# Patient Record
Sex: MALE | Race: WHITE | NOT HISPANIC OR LATINO | Employment: FULL TIME | URBAN - METROPOLITAN AREA
[De-identification: names, ages, dates, MRNs, and addresses within clinical notes are randomized per-mention and may not be internally consistent; named-entity substitution may affect disease eponyms.]

---

## 2022-11-20 ENCOUNTER — APPOINTMENT (OUTPATIENT)
Dept: CT IMAGING | Facility: CLINIC | Age: 37
End: 2022-11-20
Attending: EMERGENCY MEDICINE
Payer: COMMERCIAL

## 2022-11-20 ENCOUNTER — HOSPITAL ENCOUNTER (EMERGENCY)
Facility: CLINIC | Age: 37
Discharge: HOME | End: 2022-11-20
Attending: EMERGENCY MEDICINE | Admitting: EMERGENCY MEDICINE
Payer: COMMERCIAL

## 2022-11-20 ENCOUNTER — APPOINTMENT (OUTPATIENT)
Dept: GENERAL RADIOLOGY | Facility: CLINIC | Age: 37
End: 2022-11-20
Attending: EMERGENCY MEDICINE
Payer: COMMERCIAL

## 2022-11-20 VITALS
DIASTOLIC BLOOD PRESSURE: 75 MMHG | OXYGEN SATURATION: 99 % | TEMPERATURE: 98.2 F | SYSTOLIC BLOOD PRESSURE: 128 MMHG | RESPIRATION RATE: 18 BRPM | HEART RATE: 83 BPM

## 2022-11-20 DIAGNOSIS — S92.425A NONDISPLACED FRACTURE OF DISTAL PHALANX OF LEFT GREAT TOE, INITIAL ENCOUNTER FOR CLOSED FRACTURE: ICD-10-CM

## 2022-11-20 DIAGNOSIS — S00.83XA TRAUMATIC HEMATOMA OF FOREHEAD, INITIAL ENCOUNTER: ICD-10-CM

## 2022-11-20 DIAGNOSIS — S52.572A OTHER CLOSED INTRA-ARTICULAR FRACTURE OF DISTAL END OF LEFT RADIUS, INITIAL ENCOUNTER: ICD-10-CM

## 2022-11-20 PROBLEM — J31.0 NONALLERGIC RHINITIS: Status: ACTIVE | Noted: 2022-09-22

## 2022-11-20 PROBLEM — K40.90 LEFT INGUINAL HERNIA: Status: ACTIVE | Noted: 2020-09-17

## 2022-11-20 PROCEDURE — 73660 X-RAY EXAM OF TOE(S): CPT | Mod: LT

## 2022-11-20 PROCEDURE — 73110 X-RAY EXAM OF WRIST: CPT | Mod: LT

## 2022-11-20 PROCEDURE — 28490 TREAT BIG TOE FRACTURE: CPT | Mod: LT

## 2022-11-20 PROCEDURE — 70450 CT HEAD/BRAIN W/O DYE: CPT

## 2022-11-20 PROCEDURE — 25600 CLTX DST RDL FX/EPHYS SEP WO: CPT | Mod: LT

## 2022-11-20 PROCEDURE — 99285 EMERGENCY DEPT VISIT HI MDM: CPT | Mod: 25

## 2022-11-20 RX ORDER — IBUPROFEN 600 MG/1
600 TABLET, FILM COATED ORAL EVERY 6 HOURS PRN
Qty: 30 TABLET | Refills: 0 | Status: SHIPPED | OUTPATIENT
Start: 2022-11-20 | End: 2022-12-20

## 2022-11-20 RX ORDER — CYCLOBENZAPRINE HCL 10 MG
5-10 TABLET ORAL 3 TIMES DAILY PRN
Qty: 20 TABLET | Refills: 0 | Status: SHIPPED | OUTPATIENT
Start: 2022-11-20

## 2022-11-20 ASSESSMENT — ENCOUNTER SYMPTOMS
LIGHT-HEADEDNESS: 1
ARTHRALGIAS: 1
VOMITING: 0
NUMBNESS: 0
NAUSEA: 0
HEADACHES: 1
WOUND: 1
CONFUSION: 0
NECK PAIN: 0
NECK STIFFNESS: 1

## 2022-11-20 ASSESSMENT — ACTIVITIES OF DAILY LIVING (ADL): ADLS_ACUITY_SCORE: 35

## 2022-11-20 NOTE — ED TRIAGE NOTES
Fall from ladder. Feet were about 8ft off the ground. Contusion/abrasion to forehead. Left wrist pain. Left big toe pain. Denies LOC. Lightheaded for a time. Happened around 1200. Denies c-spine tenderness. Denies head pain. Feels alert. Denies blood thinners. Pupils equal and reactive to light.

## 2022-11-21 NOTE — ED PROVIDER NOTES
History   Chief Complaint:  Fall    The history is provided by the patient.      Con Sorensen is a 37 year old left handed male with history of inguinal hernia and lumbago who presents for evaluation after a fall. The patient reports that he was on a ladder one story up (8-10 ft.) hanging minda lights at his house when the ladder slipped out from under him around 1130 this morning. States that he landed on his left wrist and face. Notes that he has left wrist, left great toe, and left knee pain. Adds that he was lightheaded after the fall and that his neck is stiff. No LOC. Reports that he had a headache which worsened upon arrival to the emergency department, but improved after taking his own ibuprofen. Denies syncope, neck pain, abdominal pain, chest pain, shortness of breath, pain with respiration, confusion, numbness, nausea, and vomiting. Denies pain in left hand, fingers, or elbow. No injuries to the right upper extremity and right lower extremity. He denies any other symptoms.    Review of Systems   Gastrointestinal: Negative for nausea and vomiting.   Musculoskeletal: Positive for arthralgias and neck stiffness. Negative for neck pain.   Skin: Positive for wound (forehead).   Neurological: Positive for light-headedness and headaches. Negative for syncope and numbness.   Psychiatric/Behavioral: Negative for confusion.   All other systems reviewed and are negative.    Allergies:  No Known Allergies    Medications:  Fluticasone  Loratadine    Past Medical History:     Inguinal hernia, left  Lumbago    Past Surgical History:    Liver surgery    Family History:    Mother- thyroid disorder  Sister- thyroid disorder    Social History:  Presents alone  Presents via private vehicle     Physical Exam     Patient Vitals for the past 24 hrs:   BP Temp Temp src Pulse Resp SpO2   11/20/22 2106 128/75 -- -- 83 18 99 %   11/20/22 1246 133/78 98.2  F (36.8  C) Temporal 92 20 99 %     Physical Exam  General: Well  appearing, nontoxic. Resting comfortably  Head:  Right frontal forehead contusion/hematoma 6cm in diameter with superficial abrasion.  No definite bony crepitus or step-offs.  Area is tender to palpation.  Remainder of the scalp and head is otherwise atraumatic and nontender.  Eyes:  Pupils are equal, round reactive to light EOMI, no nystagmus    Conjunctivae non-injected and sclerae white  ENT:    The external nose is normal    Facial bones are normal and atraumatic.  No epistaxis.    Pinnae are normal  Neck:  Normal range of motion. Cervical spine nontender, no stepoffs    There is no rigidity noted    Trachea is in the midline  CV:  Regular rate and rhythm     Normal S1/S2, no S3/S4    No murmur or rub. Radial pulses 2+ bilaterally.  Resp:  Lungs are clear and equal bilaterally  There is no tachypnea    No increased work of breathing    No rales, wheezing, or rhonchi  GI:  Abdomen is soft, no rigidity or guarding    No distension, or mass    No tenderness or rebound tenderness   MS:  Normal muscular tone. T and L spine non-tender without stepoffs.  Significant tenderness palpation and swelling over the distal left dorsal radius.  Significant pain with any range of motion of the wrist or pronation supination of the forearm.  The remainder of the bilateral upper extremities otherwise atraumatic with full painless range of motion.  CMS is intact distally in the bilateral upper extremities.  Bilateral lower extremities are atraumatic with the exception of tenderness bruising and swelling overlying the distal left great toe.  CMS intact in the bilateral distal lower extremities.  Hips and pelvis are stable and nontender to compression.    Symmetric motor strength    No lower extremity edema  Skin:  No rash or acute skin lesions noted  Neuro: A&Ox3, GCS 15    CN II - XII intact    Speech clear, fluent, and normal    Strength 5/5 and symmetric in bilateral upper and lower extremities.    No pronator drift. No leg drift.  SILT throughout.    No ankle clonus    FTN testing normal. No tremor.     Gait normal    No meningismus   Psych:  Normal affect. Appropriate interactions.    Emergency Department Course   Imaging:  CT Head w/o Contrast   Final Result   IMPRESSION:   1.  Normal head CT.      XR Wrist Left G/E 3 Views   Final Result   IMPRESSION:   1.  Nondisplaced intra-articular fracture of the left volar radial styloid.   2.  Normal joint spacing and alignment.         XR Toe Left G/E 2 Views   Final Result   IMPRESSION:    1.  Nondisplaced fracture of the first toe distal phalanx.   2.  Normal joint spacing and alignment.   3.  First toe soft tissue swelling.            Report per radiology    Emergency Department Course:  Procedures:    Splint Placement     Procedure: Splint Placement     Indication: Fracture    Consent: Verbal     Location: Left Wrist    Preparation: Wounds were cleansed and dressed with a non-adherent bandage     Procedure detail:   Splint was applied by Tech/Nurse with my assistance  Splint type: Volar forearm   Splint materilal: Fiberglass  After placement I checked and adjusted the fit as needed to ensure proper positioning/fit   Sensation and circulation are intact after splint placement     Patient Status: The patient tolerated the procedure well: Yes. There were no complications.    Reviewed:  I reviewed nursing notes, vitals, past medical history and Care Everywhere    ED Course as of 11/20/22 2148   Sun Nov 20, 2022 1953 I obtained initial history and examined the patient as noted above.    2035 I rechecked and updated the patient.    2055 I rechecked and updated the patient.      Disposition:  The patient was discharged to home.     Impression & Plan   Medical Decision Making:  Con Sorensen is a 37 year old male who presents to the emergency department for evaluation after fall 8 to 10 feet from the top of a ladder.  On my evaluation he is well-appearing, hemodynamically stable and afebrile.   ABCs are intact.  No focal neurologic deficits.  Work-up in the emergency department reveals nondisplaced left distal phalanx fracture of the great toe as well as intra-articular left distal radius fracture again without displacement.  No indication for the need of any reduction in the emergency department.  Patient was placed in a left volar slab forearm splint and was provided with a postop shoe.  He should be completely nonweightbearing in the left upper extremity and should not bear weight on the toes of the left foot.  On head to toe trauma evaluation there is no evidence of any significant trauma to the spine, thorax or abdomen.  CT scan of the head was obtained and negative for any evidence of acute intracranial trauma, hemorrhage, fracture or other abnormality.  I discussed the need for close orthopedic follow-up.  Pain control with ice, Tylenol, ibuprofen and elevation of the affected injuries.  Patient does not able to use crutches due to his left wrist injury.  Findings and plan of care were discussed with the patient.  He is agreeable to plan of care.  Close turn precautions were provided.  He was discharged in stable and improved condition.    Diagnosis:    ICD-10-CM    1. Nondisplaced fracture of distal phalanx of left great toe, initial encounter for closed fracture  S92.425A Ankle/Foot Bracing Supplies Order      2. Other closed intra-articular fracture of distal end of left radius, initial encounter  S52.572A       3. Traumatic hematoma of forehead, initial encounter  S00.83XA         Scribe Disclosure:  I, Felipa Duque, am serving as a scribe at 7:50 PM on 11/20/2022 to document services personally performed by Vargas De La Cruz MD based on my observations and the provider's statements to me.      Vargas De La Cruz MD  11/21/22 1115

## 2023-02-18 ENCOUNTER — HEALTH MAINTENANCE LETTER (OUTPATIENT)
Age: 38
End: 2023-02-18

## 2024-03-16 ENCOUNTER — HEALTH MAINTENANCE LETTER (OUTPATIENT)
Age: 39
End: 2024-03-16

## 2025-03-22 ENCOUNTER — HEALTH MAINTENANCE LETTER (OUTPATIENT)
Age: 40
End: 2025-03-22